# Patient Record
Sex: MALE | Race: OTHER | NOT HISPANIC OR LATINO | Employment: OTHER | URBAN - METROPOLITAN AREA
[De-identification: names, ages, dates, MRNs, and addresses within clinical notes are randomized per-mention and may not be internally consistent; named-entity substitution may affect disease eponyms.]

---

## 2021-12-09 ENCOUNTER — OFFICE VISIT (OUTPATIENT)
Dept: URGENT CARE | Age: 31
End: 2021-12-09
Payer: COMMERCIAL

## 2021-12-09 ENCOUNTER — APPOINTMENT (OUTPATIENT)
Dept: RADIOLOGY | Age: 31
End: 2021-12-09
Payer: COMMERCIAL

## 2021-12-09 VITALS
OXYGEN SATURATION: 98 % | HEART RATE: 68 BPM | RESPIRATION RATE: 18 BRPM | DIASTOLIC BLOOD PRESSURE: 64 MMHG | SYSTOLIC BLOOD PRESSURE: 136 MMHG | TEMPERATURE: 97.8 F

## 2021-12-09 DIAGNOSIS — S99.911A INJURY OF RIGHT ANKLE, INITIAL ENCOUNTER: Primary | ICD-10-CM

## 2021-12-09 DIAGNOSIS — S99.911A INJURY OF RIGHT ANKLE, INITIAL ENCOUNTER: ICD-10-CM

## 2021-12-09 PROCEDURE — 73630 X-RAY EXAM OF FOOT: CPT

## 2021-12-09 PROCEDURE — 99213 OFFICE O/P EST LOW 20 MIN: CPT | Performed by: NURSE PRACTITIONER

## 2021-12-09 PROCEDURE — 73610 X-RAY EXAM OF ANKLE: CPT

## 2023-07-23 ENCOUNTER — HOSPITAL ENCOUNTER (EMERGENCY)
Facility: HOSPITAL | Age: 33
Discharge: HOME | End: 2023-07-23
Attending: EMERGENCY MEDICINE
Payer: COMMERCIAL

## 2023-07-23 VITALS
TEMPERATURE: 98.7 F | SYSTOLIC BLOOD PRESSURE: 130 MMHG | HEART RATE: 53 BPM | DIASTOLIC BLOOD PRESSURE: 92 MMHG | OXYGEN SATURATION: 98 % | RESPIRATION RATE: 20 BRPM

## 2023-07-23 DIAGNOSIS — S00.83XA CONTUSION OF FACE, INITIAL ENCOUNTER: Primary | ICD-10-CM

## 2023-07-23 PROCEDURE — 99283 EMERGENCY DEPT VISIT LOW MDM: CPT

## 2023-07-23 PROCEDURE — 99283 EMERGENCY DEPT VISIT LOW MDM: CPT | Performed by: EMERGENCY MEDICINE

## 2023-07-23 RX ORDER — ACETAMINOPHEN 325 MG/1
650 TABLET ORAL ONCE
Status: COMPLETED | OUTPATIENT
Start: 2023-07-23 | End: 2023-07-23

## 2023-07-23 RX ADMIN — ACETAMINOPHEN 650 MG: 325 TABLET, FILM COATED ORAL at 01:25
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
## 2024-03-05 ENCOUNTER — OFFICE VISIT (OUTPATIENT)
Dept: PODIATRY | Facility: CLINIC | Age: 34
End: 2024-03-05
Payer: COMMERCIAL

## 2024-03-05 VITALS
HEIGHT: 72 IN | WEIGHT: 172.4 LBS | BODY MASS INDEX: 23.35 KG/M2 | SYSTOLIC BLOOD PRESSURE: 144 MMHG | DIASTOLIC BLOOD PRESSURE: 80 MMHG | HEART RATE: 67 BPM

## 2024-03-05 DIAGNOSIS — M79.671 BILATERAL FOOT PAIN: Primary | ICD-10-CM

## 2024-03-05 DIAGNOSIS — M79.672 BILATERAL FOOT PAIN: Primary | ICD-10-CM

## 2024-03-05 DIAGNOSIS — B07.0 PLANTAR WART OF BOTH FEET: ICD-10-CM

## 2024-03-05 PROCEDURE — 17110 DESTRUCTION B9 LES UP TO 14: CPT | Performed by: STUDENT IN AN ORGANIZED HEALTH CARE EDUCATION/TRAINING PROGRAM

## 2024-03-05 PROCEDURE — 99203 OFFICE O/P NEW LOW 30 MIN: CPT | Performed by: STUDENT IN AN ORGANIZED HEALTH CARE EDUCATION/TRAINING PROGRAM

## 2024-03-05 RX ORDER — DEXTROAMPHETAMINE SACCHARATE, AMPHETAMINE ASPARTATE MONOHYDRATE, DEXTROAMPHETAMINE SULFATE AND AMPHETAMINE SULFATE 1.25; 1.25; 1.25; 1.25 MG/1; MG/1; MG/1; MG/1
CAPSULE, EXTENDED RELEASE ORAL
COMMUNITY
Start: 2023-12-08

## 2024-03-05 NOTE — PROGRESS NOTES
Assessment/Plan:    No problem-specific Assessment & Plan notes found for this encounter.       Diagnoses and all orders for this visit:    Bilateral foot pain  -     Lesion Destruction    Plantar wart of both feet  -     Lesion Destruction    Other orders  -     amphetamine-dextroamphetamine (ADDERALL XR) 5 MG 24 hr capsule; TAKE 1 CAPSULE BY MOUTH ONCE PER DAY      Plan:       - Patient was counseled and educated on the condition and the diagnosis.  The diagnosis, treatment options and prognosis were discussed in detail.     -Combination of plantar verruca versus hyperkeratotic lesion noted.  Postdebridement pinpoint bleeding was needed.  At this time we will treated as plantar verruca.  There is a possibility patient's foot deformity causing hyperkeratotic lesion to bilateral feet.    - A formal timeout including patient identification, laterality and existing allergies using [facility] protocol was conducted.The warts were pared with a scalpel down to pinpoint bleeding to remove all keratotic debris. Bleeding was controlled with light pressure. Canterone (1 drop) was applied with a sterile applicator and covered with a small sterile adhesive bandage to each lesion. Instructions were given to leave the dressings intact for 8-12 hours. After that, the bandages may be removed followed by normal bathing and redressing of the sites with an OTC salacyclic acid patch of choice or Compound W. I was very clear that it needs to be constantly treated with the sal acid patch until the next visit.     - I also discussed that maceration and whiteness of the surrounding intact skin and also possible tenderness / blistering are typical normal findings of the treatment.     -Return in 2 to 3 weeks for follow-up      Subjective:      Patient ID: Mabel De La Paz is a 34 y.o. male.    34-year-old male with past medical history as below presents for evaluation of bilateral plantar foot hyperkeratotic lesion causing discomfort with  "weightbearing.  Patient reports this has been happening since past few months.  As the calluses are growing in size it causes significant pain with walking.  Reports has tried using over-the-counter callus patches which helped with the pain a little bit as it remove the top layer of the skin.  No other complaints at this visit.        The following portions of the patient's history were reviewed and updated as appropriate: He  has no past medical history on file.  He There are no problems to display for this patient.    He  has no past surgical history on file.  Current Outpatient Medications   Medication Sig Dispense Refill    amphetamine-dextroamphetamine (ADDERALL XR) 5 MG 24 hr capsule TAKE 1 CAPSULE BY MOUTH ONCE PER DAY       No current facility-administered medications for this visit.   .    Review of Systems   All other systems reviewed and are negative.        Objective:      /80 (BP Location: Left arm, Patient Position: Sitting, Cuff Size: Large)   Pulse 67   Ht 6' (1.829 m)   Wt 78.2 kg (172 lb 6.4 oz)   BMI 23.38 kg/m²          Physical Exam  Vitals reviewed.   Cardiovascular:      Pulses:           Dorsalis pedis pulses are 2+ on the right side and 2+ on the left side.        Posterior tibial pulses are 2+ on the right side and 2+ on the left side.   Musculoskeletal:        Feet:    Feet:      Right foot:      Skin integrity: Callus present.      Left foot:      Skin integrity: Callus present.      Comments: Bilateral plantar foot hyperkeratotic lesions noted pain with palpation.  Postdebridement pinpoint bleeding noted consistent with plantar verruca.      Lesion Destruction    Date/Time: 3/5/2024 11:30 AM    Performed by: Edie Michelle DPM  Authorized by: Edie Michelle DPM  Universal Protocol:  Consent: Verbal consent obtained.  Risks and benefits: risks, benefits and alternatives were discussed  Consent given by: patient  Time out: Immediately prior to procedure a \"time out\" was called to " verify the correct patient, procedure, equipment, support staff and site/side marked as required.  Patient understanding: patient states understanding of the procedure being performed  Patient identity confirmed: verbally with patient    Procedure Details - Lesion Destruction:     Number of Lesions:  2  Lesion 1:     Body area:  Lower extremity    Lower extremity location:  R foot    Malignancy: benign lesion      Destruction method: scissors used for extraction    Lesion 2:     Body area:  Lower extremity    Lower extremity location:  L foot    Malignancy: benign lesion      Destruction method: scissors used for extraction

## 2024-03-05 NOTE — PATIENT INSTRUCTIONS
Buy Compound W or Salicylic Acid from pharmacy over the counter  Within 8-12 hours remove all bandages/dressings from the foot, and wash foot with soap and water  Pat dry clean with a towel and apply Compound W or Salicylic Acid to the wart lesions and a Band-Aid, daily  See Dr. Michelle in 2 weeks